# Patient Record
Sex: MALE
[De-identification: names, ages, dates, MRNs, and addresses within clinical notes are randomized per-mention and may not be internally consistent; named-entity substitution may affect disease eponyms.]

---

## 2023-09-29 ENCOUNTER — APPOINTMENT (OUTPATIENT)
Dept: HUMAN REPRODUCTION | Facility: CLINIC | Age: 38
End: 2023-09-29

## 2023-11-03 ENCOUNTER — APPOINTMENT (OUTPATIENT)
Dept: HUMAN REPRODUCTION | Facility: CLINIC | Age: 38
End: 2023-11-03
Payer: COMMERCIAL

## 2023-11-03 PROCEDURE — 36415 COLL VENOUS BLD VENIPUNCTURE: CPT

## 2025-05-02 ENCOUNTER — EMERGENCY (EMERGENCY)
Facility: HOSPITAL | Age: 40
LOS: 1 days | End: 2025-05-02
Attending: STUDENT IN AN ORGANIZED HEALTH CARE EDUCATION/TRAINING PROGRAM | Admitting: STUDENT IN AN ORGANIZED HEALTH CARE EDUCATION/TRAINING PROGRAM
Payer: COMMERCIAL

## 2025-05-02 VITALS
DIASTOLIC BLOOD PRESSURE: 82 MMHG | HEIGHT: 71 IN | SYSTOLIC BLOOD PRESSURE: 115 MMHG | RESPIRATION RATE: 18 BRPM | TEMPERATURE: 98 F | OXYGEN SATURATION: 100 % | WEIGHT: 205.03 LBS | HEART RATE: 63 BPM

## 2025-05-02 VITALS
OXYGEN SATURATION: 100 % | HEART RATE: 61 BPM | SYSTOLIC BLOOD PRESSURE: 108 MMHG | DIASTOLIC BLOOD PRESSURE: 75 MMHG | RESPIRATION RATE: 17 BRPM

## 2025-05-02 PROCEDURE — 93971 EXTREMITY STUDY: CPT

## 2025-05-02 PROCEDURE — 99284 EMERGENCY DEPT VISIT MOD MDM: CPT | Mod: 25

## 2025-05-02 PROCEDURE — 93971 EXTREMITY STUDY: CPT | Mod: 26,RT

## 2025-05-02 PROCEDURE — 99284 EMERGENCY DEPT VISIT MOD MDM: CPT

## 2025-05-02 NOTE — ED ADULT TRIAGE NOTE - CHIEF COMPLAINT QUOTE
Pt c/o RLE pain, swelling/tenderness x 2 weeks. + recent travel. Denies CP/sob, palpitations, cough, n/t. Pt sent from outside  for r/o DVT. Denies pmHx.

## 2025-05-02 NOTE — ED PROVIDER NOTE - PROGRESS NOTE DETAILS
u/s neg for dvt, ace wrap applied in ED, d/c to f/u with pmd, ortho, recommend elevate, NSAIDs PRN pain

## 2025-05-02 NOTE — ED ADULT NURSE NOTE - OBJECTIVE STATEMENT
Pt reports CRYSTAL LE swelling x 2 weeks, R>L, reports recent 3 hour flight to [place] after swelling initiated, denies fever, or sick sx. Denies dyspnea, palpitations, SOB, syncope or other sx. Denies cardiac or renal hx, reports sent for possible DVT ro RLE. popliteal and pedal pulse strong CRYSTAL, PNV intact

## 2025-05-02 NOTE — ED PROVIDER NOTE - PATIENT PORTAL LINK FT
You can access the FollowMyHealth Patient Portal offered by [facility] by registering at the following website: [email or web address]. By joining [facility]’s FollowMyHealth portal, you will also be able to view your health information using other applications (apps) compatible with our system.

## 2025-05-02 NOTE — ED PROVIDER NOTE - CLINICAL SUMMARY MEDICAL DECISION MAKING FREE TEXT BOX
38 y/o m presents c/o atraumatic right ankle pain/swelling x 2 weeks.  Ext NVI, pt ambulatory in ED, will obtain u/s r/o dvt although majority of swelling and pain is located in ankle, had neg ankle xrays at podiatrist office prior to coming to ED.  Consider msk etiology of pain if u/s neg and likely needing further outpatient w/u with ortho

## 2025-05-02 NOTE — ED PROVIDER NOTE - OBJECTIVE STATEMENT
40 y/o m presents c/o right lower ext pain, swelling x 2 weeks.  Pt stating he woke up with pain to the ankle and area became swollen, having pain from ankle up to lower calf, worse with movement and walking.  Pt went to a podiatrist today for evaluation of these symptoms and was sent to ED for further evaluation including request for u/s r/o dvt.   Pt stating he hasn't been taking anything for pain, doesn't remember any specific injury to the area.  Denies numbness/tingling to ext, CP, SOB, all other ROS negative.

## 2025-05-02 NOTE — ED ADULT NURSE NOTE - NSFALLUNIVINTERV_ED_ALL_ED
Bed/Stretcher in lowest position, wheels locked, appropriate side rails in place/Call bell, personal items and telephone in reach/Instruct patient to call for assistance before getting out of bed/chair/stretcher/Non-slip footwear applied when patient is off stretcher/[place] to call system/Physically safe environment - no spills, clutter or unnecessary equipment/Purposeful proactive rounding/Room/bathroom lighting operational, light cord in reach

## 2025-05-02 NOTE — ED ADULT NURSE NOTE - NSHOSCREENINGQ1_ED_ALL_ED
· Patient was recently treated outpatient for UTI with nitrofurantoin and Bactrim without improvement  · Urinalysis consistent with UTI  · Continue on ceftriaxone day 2  · Follow urine culture No

## 2025-05-06 DIAGNOSIS — M79.89 OTHER SPECIFIED SOFT TISSUE DISORDERS: ICD-10-CM

## 2025-05-06 DIAGNOSIS — M25.571 PAIN IN RIGHT ANKLE AND JOINTS OF RIGHT FOOT: ICD-10-CM
